# Patient Record
Sex: MALE | Race: WHITE | NOT HISPANIC OR LATINO | Employment: OTHER | URBAN - METROPOLITAN AREA
[De-identification: names, ages, dates, MRNs, and addresses within clinical notes are randomized per-mention and may not be internally consistent; named-entity substitution may affect disease eponyms.]

---

## 2021-03-12 ENCOUNTER — OUTSIDE PLACE OF SERVICE (OUTPATIENT)
Dept: CARDIOLOGY | Facility: CLINIC | Age: 81
End: 2021-03-12
Payer: MEDICARE

## 2021-03-12 PROCEDURE — 93010 ELECTROCARDIOGRAM REPORT: CPT | Mod: ,,, | Performed by: INTERNAL MEDICINE

## 2021-03-12 PROCEDURE — 93010 PR ELECTROCARDIOGRAM REPORT: ICD-10-PCS | Mod: 76,,, | Performed by: INTERNAL MEDICINE

## 2021-03-13 ENCOUNTER — OUTSIDE PLACE OF SERVICE (OUTPATIENT)
Dept: CARDIOLOGY | Facility: CLINIC | Age: 81
End: 2021-03-13
Payer: MEDICARE

## 2021-03-13 PROCEDURE — 93010 PR ELECTROCARDIOGRAM REPORT: ICD-10-PCS | Mod: ,,, | Performed by: INTERNAL MEDICINE

## 2021-03-13 PROCEDURE — 93010 ELECTROCARDIOGRAM REPORT: CPT | Mod: ,,, | Performed by: INTERNAL MEDICINE

## 2021-03-17 ENCOUNTER — HOSPITAL ENCOUNTER (OUTPATIENT)
Dept: TELEMEDICINE | Facility: HOSPITAL | Age: 81
Discharge: HOME | End: 2021-03-17
Payer: MEDICARE

## 2021-03-17 DIAGNOSIS — R41.0 DELIRIUM: ICD-10-CM

## 2021-03-17 PROCEDURE — G0425 INPT/ED TELECONSULT30: HCPCS | Mod: GT,,, | Performed by: NURSE PRACTITIONER

## 2021-03-17 PROCEDURE — G0425 PR INPT TELEHEALTH CONSULT 30M: ICD-10-PCS | Mod: GT,,, | Performed by: NURSE PRACTITIONER

## 2021-06-16 ENCOUNTER — OUTSIDE PLACE OF SERVICE (OUTPATIENT)
Dept: FAMILY MEDICINE | Facility: CLINIC | Age: 81
End: 2021-06-16
Payer: MEDICARE

## 2021-06-16 PROCEDURE — 99307 SBSQ NF CARE SF MDM 10: CPT | Mod: ,,, | Performed by: FAMILY MEDICINE

## 2021-06-16 PROCEDURE — 99307 PR NURSING FAC CARE, SUBSEQ, IMPROVING: ICD-10-PCS | Mod: ,,, | Performed by: FAMILY MEDICINE

## 2021-07-02 ENCOUNTER — LAB VISIT (OUTPATIENT)
Dept: LAB | Facility: HOSPITAL | Age: 81
End: 2021-07-02
Attending: INTERNAL MEDICINE
Payer: MEDICARE

## 2021-07-02 ENCOUNTER — OFFICE VISIT (OUTPATIENT)
Dept: HEMATOLOGY/ONCOLOGY | Facility: CLINIC | Age: 81
End: 2021-07-02
Payer: MEDICARE

## 2021-07-02 VITALS
RESPIRATION RATE: 18 BRPM | HEART RATE: 74 BPM | DIASTOLIC BLOOD PRESSURE: 73 MMHG | TEMPERATURE: 97 F | OXYGEN SATURATION: 96 % | WEIGHT: 147.94 LBS | SYSTOLIC BLOOD PRESSURE: 136 MMHG

## 2021-07-02 DIAGNOSIS — Z71.89 ADVANCE CARE PLANNING: ICD-10-CM

## 2021-07-02 DIAGNOSIS — D47.3 ESSENTIAL THROMBOCYTOSIS: ICD-10-CM

## 2021-07-02 DIAGNOSIS — D47.3 ESSENTIAL THROMBOCYTOSIS: Primary | ICD-10-CM

## 2021-07-02 LAB
ALBUMIN SERPL BCP-MCNC: 3.7 G/DL (ref 3.5–5.2)
ALP SERPL-CCNC: 217 U/L (ref 55–135)
ALT SERPL W/O P-5'-P-CCNC: 9 U/L (ref 10–44)
ANION GAP SERPL CALC-SCNC: 10 MMOL/L (ref 8–16)
ANISOCYTOSIS BLD QL SMEAR: ABNORMAL
AST SERPL-CCNC: 23 U/L (ref 10–40)
BASOPHILS # BLD AUTO: 0.24 K/UL (ref 0–0.2)
BASOPHILS NFR BLD: 1.9 % (ref 0–1.9)
BILIRUB SERPL-MCNC: 0.3 MG/DL (ref 0.1–1)
BUN SERPL-MCNC: 20 MG/DL (ref 8–23)
CALCIUM SERPL-MCNC: 8.7 MG/DL (ref 8.7–10.5)
CHLORIDE SERPL-SCNC: 109 MMOL/L (ref 95–110)
CO2 SERPL-SCNC: 23 MMOL/L (ref 23–29)
CREAT SERPL-MCNC: 0.9 MG/DL (ref 0.5–1.4)
DIFFERENTIAL METHOD: ABNORMAL
EOSINOPHIL # BLD AUTO: 0.5 K/UL (ref 0–0.5)
EOSINOPHIL NFR BLD: 3.7 % (ref 0–8)
ERYTHROCYTE [DISTWIDTH] IN BLOOD BY AUTOMATED COUNT: 15.9 % (ref 11.5–14.5)
EST. GFR  (AFRICAN AMERICAN): >60 ML/MIN/1.73 M^2
EST. GFR  (NON AFRICAN AMERICAN): >60 ML/MIN/1.73 M^2
GLUCOSE SERPL-MCNC: 92 MG/DL (ref 70–110)
HCT VFR BLD AUTO: 38.8 % (ref 40–54)
HGB BLD-MCNC: 12 G/DL (ref 14–18)
HYPOCHROMIA BLD QL SMEAR: ABNORMAL
IMM GRANULOCYTES # BLD AUTO: 0.09 K/UL (ref 0–0.04)
IMM GRANULOCYTES NFR BLD AUTO: 0.7 % (ref 0–0.5)
LDH SERPL L TO P-CCNC: 322 U/L (ref 110–260)
LYMPHOCYTES # BLD AUTO: 1.4 K/UL (ref 1–4.8)
LYMPHOCYTES NFR BLD: 10.7 % (ref 18–48)
MCH RBC QN AUTO: 24.6 PG (ref 27–31)
MCHC RBC AUTO-ENTMCNC: 30.9 G/DL (ref 32–36)
MCV RBC AUTO: 80 FL (ref 82–98)
MONOCYTES # BLD AUTO: 1 K/UL (ref 0.3–1)
MONOCYTES NFR BLD: 7.5 % (ref 4–15)
NEUTROPHILS # BLD AUTO: 9.8 K/UL (ref 1.8–7.7)
NEUTROPHILS NFR BLD: 75.5 % (ref 38–73)
NRBC BLD-RTO: 0 /100 WBC
PLATELET # BLD AUTO: 1376 K/UL (ref 150–450)
PLATELET BLD QL SMEAR: ABNORMAL
PMV BLD AUTO: 10 FL (ref 9.2–12.9)
POIKILOCYTOSIS BLD QL SMEAR: ABNORMAL
POTASSIUM SERPL-SCNC: 3.8 MMOL/L (ref 3.5–5.1)
PROT SERPL-MCNC: 7.3 G/DL (ref 6–8.4)
RBC # BLD AUTO: 4.87 M/UL (ref 4.6–6.2)
SODIUM SERPL-SCNC: 142 MMOL/L (ref 136–145)
URATE SERPL-MCNC: 5.3 MG/DL (ref 3.4–7)
WBC # BLD AUTO: 12.96 K/UL (ref 3.9–12.7)

## 2021-07-02 PROCEDURE — 99497 PR ADVNCD CARE PLAN 30 MIN: ICD-10-PCS | Mod: S$GLB,,, | Performed by: INTERNAL MEDICINE

## 2021-07-02 PROCEDURE — 1158F PR ADVANCE CARE PLANNING DISCUSS DOCUMENTED IN MEDICAL RECORD: ICD-10-PCS | Mod: S$GLB,,, | Performed by: INTERNAL MEDICINE

## 2021-07-02 PROCEDURE — 1159F PR MEDICATION LIST DOCUMENTED IN MEDICAL RECORD: ICD-10-PCS | Mod: S$GLB,,, | Performed by: INTERNAL MEDICINE

## 2021-07-02 PROCEDURE — 81270 JAK2 GENE: CPT | Performed by: INTERNAL MEDICINE

## 2021-07-02 PROCEDURE — 99999 PR PBB SHADOW E&M-EST. PATIENT-LVL III: CPT | Mod: PBBFAC,,, | Performed by: INTERNAL MEDICINE

## 2021-07-02 PROCEDURE — 99204 PR OFFICE/OUTPT VISIT, NEW, LEVL IV, 45-59 MIN: ICD-10-PCS | Mod: S$GLB,,, | Performed by: INTERNAL MEDICINE

## 2021-07-02 PROCEDURE — 3288F FALL RISK ASSESSMENT DOCD: CPT | Mod: S$GLB,,, | Performed by: INTERNAL MEDICINE

## 2021-07-02 PROCEDURE — 36415 COLL VENOUS BLD VENIPUNCTURE: CPT | Performed by: INTERNAL MEDICINE

## 2021-07-02 PROCEDURE — 1126F AMNT PAIN NOTED NONE PRSNT: CPT | Mod: S$GLB,,, | Performed by: INTERNAL MEDICINE

## 2021-07-02 PROCEDURE — 84550 ASSAY OF BLOOD/URIC ACID: CPT | Performed by: INTERNAL MEDICINE

## 2021-07-02 PROCEDURE — 99999 PR PBB SHADOW E&M-EST. PATIENT-LVL III: ICD-10-PCS | Mod: PBBFAC,,, | Performed by: INTERNAL MEDICINE

## 2021-07-02 PROCEDURE — 1126F PR PAIN SEVERITY QUANTIFIED, NO PAIN PRESENT: ICD-10-PCS | Mod: S$GLB,,, | Performed by: INTERNAL MEDICINE

## 2021-07-02 PROCEDURE — 99497 ADVNCD CARE PLAN 30 MIN: CPT | Mod: S$GLB,,, | Performed by: INTERNAL MEDICINE

## 2021-07-02 PROCEDURE — 1100F PTFALLS ASSESS-DOCD GE2>/YR: CPT | Mod: S$GLB,,, | Performed by: INTERNAL MEDICINE

## 2021-07-02 PROCEDURE — 1159F MED LIST DOCD IN RCRD: CPT | Mod: S$GLB,,, | Performed by: INTERNAL MEDICINE

## 2021-07-02 PROCEDURE — 1100F PR PT FALLS ASSESS DOC 2+ FALLS/FALL W/INJURY/YR: ICD-10-PCS | Mod: S$GLB,,, | Performed by: INTERNAL MEDICINE

## 2021-07-02 PROCEDURE — 1158F ADVNC CARE PLAN TLK DOCD: CPT | Mod: S$GLB,,, | Performed by: INTERNAL MEDICINE

## 2021-07-02 PROCEDURE — 85025 COMPLETE CBC W/AUTO DIFF WBC: CPT | Performed by: INTERNAL MEDICINE

## 2021-07-02 PROCEDURE — 99204 OFFICE O/P NEW MOD 45 MIN: CPT | Mod: S$GLB,,, | Performed by: INTERNAL MEDICINE

## 2021-07-02 PROCEDURE — 3288F PR FALLS RISK ASSESSMENT DOCUMENTED: ICD-10-PCS | Mod: S$GLB,,, | Performed by: INTERNAL MEDICINE

## 2021-07-02 PROCEDURE — 83615 LACTATE (LD) (LDH) ENZYME: CPT | Performed by: INTERNAL MEDICINE

## 2021-07-02 PROCEDURE — 80053 COMPREHEN METABOLIC PANEL: CPT | Performed by: INTERNAL MEDICINE

## 2021-07-02 RX ORDER — HYDROXYUREA 500 MG/1
500 CAPSULE ORAL DAILY
Qty: 30 CAPSULE | Refills: 11 | Status: SHIPPED | OUTPATIENT
Start: 2021-07-02 | End: 2021-07-02 | Stop reason: SDUPTHER

## 2021-07-02 RX ORDER — HYDROXYUREA 500 MG/1
500 CAPSULE ORAL DAILY
Qty: 30 CAPSULE | Refills: 11 | Status: SHIPPED | OUTPATIENT
Start: 2021-07-02 | End: 2021-08-01

## 2021-07-02 RX ORDER — HYDROXYUREA 500 MG/1
500 CAPSULE ORAL DAILY
Qty: 30 CAPSULE | Refills: 11 | Status: SHIPPED | OUTPATIENT
Start: 2021-07-02 | End: 2021-07-02

## 2021-07-02 RX ORDER — HYDROXYUREA 500 MG/1
500 CAPSULE ORAL 2 TIMES DAILY
Qty: 60 CAPSULE | Refills: 11 | Status: SHIPPED | OUTPATIENT
Start: 2021-07-02 | End: 2021-07-02

## 2021-07-07 LAB
MPNR  FINAL DIAGNOSIS: NORMAL
MPNR  SPECIMEN TYPE: NORMAL
MPNR RESULT: NORMAL

## 2021-07-28 ENCOUNTER — OUTSIDE PLACE OF SERVICE (OUTPATIENT)
Dept: FAMILY MEDICINE | Facility: CLINIC | Age: 81
End: 2021-07-28
Payer: MEDICARE

## 2021-07-28 PROCEDURE — 99308 PR NURSING FAC CARE, SUBSEQ, MINOR COMPLIC: ICD-10-PCS | Mod: ,,, | Performed by: FAMILY MEDICINE

## 2021-07-28 PROCEDURE — 99308 SBSQ NF CARE LOW MDM 20: CPT | Mod: ,,, | Performed by: FAMILY MEDICINE

## 2021-07-30 ENCOUNTER — LAB VISIT (OUTPATIENT)
Dept: LAB | Facility: HOSPITAL | Age: 81
End: 2021-07-30
Attending: INTERNAL MEDICINE
Payer: MEDICARE

## 2021-07-30 DIAGNOSIS — D47.3 ESSENTIAL THROMBOCYTOSIS: ICD-10-CM

## 2021-07-30 LAB
ALBUMIN SERPL BCP-MCNC: 4.4 G/DL (ref 3.5–5.2)
ALP SERPL-CCNC: 143 U/L (ref 38–126)
ALT SERPL W/O P-5'-P-CCNC: 19 U/L (ref 10–44)
ANION GAP SERPL CALC-SCNC: 10 MMOL/L (ref 8–16)
AST SERPL-CCNC: 39 U/L (ref 15–46)
BASOPHILS # BLD AUTO: 0.27 K/UL (ref 0–0.2)
BASOPHILS NFR BLD: 3 % (ref 0–1.9)
BILIRUB SERPL-MCNC: 0.6 MG/DL (ref 0.1–1)
CALCIUM SERPL-MCNC: 9.5 MG/DL (ref 8.7–10.5)
CHLORIDE SERPL-SCNC: 104 MMOL/L (ref 95–110)
CO2 SERPL-SCNC: 26 MMOL/L (ref 23–29)
CREAT SERPL-MCNC: 0.98 MG/DL (ref 0.5–1.4)
DIFFERENTIAL METHOD: ABNORMAL
EOSINOPHIL # BLD AUTO: 0.5 K/UL (ref 0–0.5)
EOSINOPHIL NFR BLD: 5.1 % (ref 0–8)
ERYTHROCYTE [DISTWIDTH] IN BLOOD BY AUTOMATED COUNT: 16.1 % (ref 11.5–14.5)
EST. GFR  (AFRICAN AMERICAN): >60 ML/MIN/1.73 M^2
EST. GFR  (NON AFRICAN AMERICAN): >60 ML/MIN/1.73 M^2
GLUCOSE SERPL-MCNC: 96 MG/DL (ref 70–110)
HCT VFR BLD AUTO: 38.7 % (ref 40–54)
HGB BLD-MCNC: 11.9 G/DL (ref 14–18)
IMM GRANULOCYTES # BLD AUTO: 0.06 K/UL (ref 0–0.04)
IMM GRANULOCYTES NFR BLD AUTO: 0.7 % (ref 0–0.5)
LDH SERPL L TO P-CCNC: 274 U/L (ref 110–260)
LYMPHOCYTES # BLD AUTO: 1.8 K/UL (ref 1–4.8)
LYMPHOCYTES NFR BLD: 19.8 % (ref 18–48)
MCH RBC QN AUTO: 24.6 PG (ref 27–31)
MCHC RBC AUTO-ENTMCNC: 30.7 G/DL (ref 32–36)
MCV RBC AUTO: 80 FL (ref 82–98)
MONOCYTES # BLD AUTO: 0.6 K/UL (ref 0.3–1)
MONOCYTES NFR BLD: 6.3 % (ref 4–15)
NEUTROPHILS # BLD AUTO: 5.9 K/UL (ref 1.8–7.7)
NEUTROPHILS NFR BLD: 65.1 % (ref 38–73)
NRBC BLD-RTO: 0 /100 WBC
PLATELET # BLD AUTO: 277 K/UL (ref 150–450)
PMV BLD AUTO: 10.7 FL (ref 9.2–12.9)
POTASSIUM SERPL-SCNC: 3.9 MMOL/L (ref 3.5–5.1)
PROT SERPL-MCNC: 7.3 G/DL (ref 6–8.4)
RBC # BLD AUTO: 4.83 M/UL (ref 4.6–6.2)
SODIUM SERPL-SCNC: 140 MMOL/L (ref 136–145)
URATE SERPL-MCNC: 5.4 MG/DL (ref 3.4–7)
UUN UR-MCNC: 18 MG/DL (ref 2–20)
WBC # BLD AUTO: 9.05 K/UL (ref 3.9–12.7)

## 2021-07-30 PROCEDURE — 80053 COMPREHEN METABOLIC PANEL: CPT | Mod: PO | Performed by: INTERNAL MEDICINE

## 2021-07-30 PROCEDURE — 83615 LACTATE (LD) (LDH) ENZYME: CPT | Performed by: INTERNAL MEDICINE

## 2021-07-30 PROCEDURE — 36415 COLL VENOUS BLD VENIPUNCTURE: CPT | Mod: PO | Performed by: INTERNAL MEDICINE

## 2021-07-30 PROCEDURE — 85025 COMPLETE CBC W/AUTO DIFF WBC: CPT | Mod: PO | Performed by: INTERNAL MEDICINE

## 2021-07-30 PROCEDURE — 84550 ASSAY OF BLOOD/URIC ACID: CPT | Performed by: INTERNAL MEDICINE

## 2021-09-29 ENCOUNTER — OUTSIDE PLACE OF SERVICE (OUTPATIENT)
Dept: FAMILY MEDICINE | Facility: CLINIC | Age: 81
End: 2021-09-29
Payer: MEDICARE

## 2021-09-29 PROCEDURE — 99307 SBSQ NF CARE SF MDM 10: CPT | Mod: ,,, | Performed by: FAMILY MEDICINE

## 2021-09-29 PROCEDURE — 99307 PR NURSING FAC CARE, SUBSEQ, IMPROVING: ICD-10-PCS | Mod: ,,, | Performed by: FAMILY MEDICINE

## 2021-11-03 ENCOUNTER — OUTSIDE PLACE OF SERVICE (OUTPATIENT)
Dept: FAMILY MEDICINE | Facility: CLINIC | Age: 81
End: 2021-11-03
Payer: MEDICARE

## 2021-11-03 PROCEDURE — 99308 PR NURSING FAC CARE, SUBSEQ, MINOR COMPLIC: ICD-10-PCS | Mod: ,,, | Performed by: FAMILY MEDICINE

## 2021-11-03 PROCEDURE — 99308 SBSQ NF CARE LOW MDM 20: CPT | Mod: ,,, | Performed by: FAMILY MEDICINE

## 2022-01-11 ENCOUNTER — OUTSIDE PLACE OF SERVICE (OUTPATIENT)
Dept: FAMILY MEDICINE | Facility: CLINIC | Age: 82
End: 2022-01-11
Payer: MEDICARE

## 2022-01-11 PROCEDURE — 99499 NO LOS: ICD-10-PCS | Mod: ,,, | Performed by: FAMILY MEDICINE

## 2022-01-11 PROCEDURE — 99499 UNLISTED E&M SERVICE: CPT | Mod: ,,, | Performed by: FAMILY MEDICINE

## 2022-01-21 ENCOUNTER — HOSPITAL ENCOUNTER (EMERGENCY)
Facility: HOSPITAL | Age: 82
Discharge: HOME | End: 2022-01-21
Attending: STUDENT IN AN ORGANIZED HEALTH CARE EDUCATION/TRAINING PROGRAM
Payer: MEDICARE

## 2022-01-21 VITALS
TEMPERATURE: 98 F | RESPIRATION RATE: 18 BRPM | HEIGHT: 72 IN | DIASTOLIC BLOOD PRESSURE: 67 MMHG | HEART RATE: 72 BPM | OXYGEN SATURATION: 97 % | SYSTOLIC BLOOD PRESSURE: 142 MMHG | BODY MASS INDEX: 24.38 KG/M2 | WEIGHT: 180 LBS

## 2022-01-21 DIAGNOSIS — S70.02XA CONTUSION OF LEFT HIP, INITIAL ENCOUNTER: ICD-10-CM

## 2022-01-21 DIAGNOSIS — M25.559 HIP PAIN, ACUTE: ICD-10-CM
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
## 2022-01-21 PROCEDURE — 63600175 PHARM REV CODE 636 W HCPCS: Performed by: STUDENT IN AN ORGANIZED HEALTH CARE EDUCATION/TRAINING PROGRAM

## 2022-01-21 PROCEDURE — 96372 THER/PROPH/DIAG INJ SC/IM: CPT

## 2022-01-21 PROCEDURE — 99285 EMERGENCY DEPT VISIT HI MDM: CPT | Mod: 25

## 2022-01-21 RX ORDER — MORPHINE SULFATE 4 MG/ML
5 INJECTION, SOLUTION INTRAMUSCULAR; INTRAVENOUS
Status: COMPLETED | OUTPATIENT
Start: 2022-01-21 | End: 2022-01-21

## 2022-01-21 RX ADMIN — MORPHINE SULFATE 5 MG: 4 INJECTION, SOLUTION INTRAMUSCULAR; INTRAVENOUS at 04:01

## 2022-01-21 NOTE — DISCHARGE INSTRUCTIONS
Tylenol for pain.  Please return if he gets worse or if new problems develop.  Please have him follow-up with his primary care doctor in 1 week.  We may apply ice to the left hip as needed.

## 2022-01-21 NOTE — ED PROVIDER NOTES
Encounter Date: 2022       History     Chief Complaint   Patient presents with    Fall     Pt is from [facility]. Reportedly fell at 2300 and now has Left hip pain with bruising. EMS denies any shortening. GCS 14. BIB [facility]     HPI     81-year-old male with past medical history of dementia, anemia, depression, anxiety, osteoarthritis who presents from [facility] with left hip pain. Patient reports he was attempting to get into his wheelchair when he slipped and fell.  Patient states he landed on his left hip and also struck his head and both elbows.  Denies other injuries at this time.  Denies other associated symptoms.  Denies loss of consciousness.  No history of anticoagulation use.  Patient reported to have a baseline GCS of 14.     Review of patient's allergies indicates:   Allergen Reactions    Benadryl allergy decongestant     Iodine and iodide containing products      Past Medical History:   Diagnosis Date    Anemia     Anxiety     Dementia     Depression     Dysphagia     GERD (gastroesophageal reflux disease)     Insomnia     Osteoarthritis      History reviewed. No pertinent surgical history.  History reviewed. No pertinent family history.  Social History     Tobacco Use    Smoking status: Former Smoker     Types: Cigarettes     Start date:      Quit date: 2017     Years since quittin.0    Smokeless tobacco: Never Used   Substance Use Topics    Alcohol use: Not Currently    Drug use: Not Currently     Review of Systems   Constitutional: Negative for chills and fever.   HENT: Negative for drooling and facial swelling.    Eyes: Negative for pain and visual disturbance.   Respiratory: Negative for cough and shortness of breath.    Cardiovascular: Negative for chest pain.   Gastrointestinal: Negative for abdominal pain, nausea and vomiting.   Genitourinary: Negative for dysuria.   Musculoskeletal: Positive for neck pain. Negative for back pain.   Neurological: Positive  for headaches. Negative for syncope and weakness.   Psychiatric/Behavioral: Negative for confusion.       Physical Exam     Initial Vitals [01/21/22 0403]   BP Pulse Resp Temp SpO2   (!) 149/70 67 18 97.4 °F (36.3 °C) 98 %      MAP       --         Physical Exam    Nursing note and vitals reviewed.  Constitutional: He appears well-nourished. He is not diaphoretic.   HENT:   Head: Normocephalic and atraumatic.   Right Ear: External ear normal.   Left Ear: External ear normal.   Eyes: Conjunctivae and EOM are normal. Pupils are equal, round, and reactive to light. No scleral icterus.   Neck: Neck supple. No tracheal deviation present.   Normal range of motion.  Cardiovascular: Normal rate, regular rhythm, normal heart sounds and intact distal pulses.   Pulmonary/Chest: Breath sounds normal. No respiratory distress.   Abdominal: Abdomen is soft.   Musculoskeletal:         General: Tenderness present.      Cervical back: Normal range of motion and neck supple. No edema, erythema or rigidity. Spinous process tenderness and muscular tenderness present. Normal range of motion.      Right hip: Normal.      Left hip: Tenderness and bony tenderness present. No deformity, lacerations or crepitus. Decreased range of motion. Normal strength.      Right upper leg: Normal.      Left upper leg: Tenderness and bony tenderness present. No swelling, edema or deformity.     Neurological: He is alert and oriented to person, place, and time. He has normal strength. No cranial nerve deficit or sensory deficit.   Skin: Skin is warm and dry. Capillary refill takes less than 2 seconds. Bruising (over the lateral aspect of the proximal left femur ) noted. No erythema.   Psychiatric: He has a normal mood and affect.         ED Course   Procedures  Labs Reviewed - No data to display       Imaging Results          X-Ray Hip 2 or 3 views Left (with Pelvis when performed) (Final result)  Result time 01/21/22 06:03:11    Final result by Ebonie PINEDA  MD Ab (01/21/22 06:03:11)                 Impression:      No radiographic evidence of acute osseous injury or dislocation.  Further evaluation as warranted clinically.      Electronically signed by: Ebonie Berger MD  Date:    01/21/2022  Time:    06:03             Narrative:    EXAMINATION:  XR HIP WITH PELVIS WHEN PERFORMED, 2 OR 3 VIEWS LEFT; XR FEMUR 2 VIEW LEFT    CLINICAL HISTORY:  Pain in unspecified hip    TECHNIQUE:  AP view of the pelvis and frog leg lateral view of the left hip were performed.    AP and lateral views of the left femur were performed.    COMPARISON:  None    FINDINGS:  The bilateral femoral heads appear well seated within the acetabula with bilateral degenerative arthrosis.  The left femur appears intact.  Prominent vascular calcifications are present.  The ilioischial and iliopectineal lines appear maintained.There is no significant pubic symphyseal or sacroiliac joint diastasis.The sacrum is partially obscured by overlying bowel gas. Multiple surgical clips and brachytherapy seeds project over the pelvis.                               X-Ray Femur Ap/Lat Left (Final result)  Result time 01/21/22 06:03:11    Final result by Ebonie Berger MD (01/21/22 06:03:11)                 Impression:      No radiographic evidence of acute osseous injury or dislocation.  Further evaluation as warranted clinically.      Electronically signed by: Ebonie Berger MD  Date:    01/21/2022  Time:    06:03             Narrative:    EXAMINATION:  XR HIP WITH PELVIS WHEN PERFORMED, 2 OR 3 VIEWS LEFT; XR FEMUR 2 VIEW LEFT    CLINICAL HISTORY:  Pain in unspecified hip    TECHNIQUE:  AP view of the pelvis and frog leg lateral view of the left hip were performed.    AP and lateral views of the left femur were performed.    COMPARISON:  None    FINDINGS:  The bilateral femoral heads appear well seated within the acetabula with bilateral degenerative arthrosis.  The left femur appears intact.  Prominent vascular  calcifications are present.  The ilioischial and iliopectineal lines appear maintained.There is no significant pubic symphyseal or sacroiliac joint diastasis.The sacrum is partially obscured by overlying bowel gas. Multiple surgical clips and brachytherapy seeds project over the pelvis.                               CT Head Without Contrast (Final result)  Result time 01/21/22 05:47:11    Final result by Ebonie Berger MD (01/21/22 05:47:11)                 Impression:      1. No CT evidence of acute intracranial abnormality. Clinical correlation and further evaluation as warranted.  2. Generalized cerebral volume loss and findings suggestive of chronic microvascular ischemic change.  3. No CT evidence of acute cervical spine fracture.  Clinical correlation and further evaluation as warranted.  4. Significant multilevel degenerative change of the cervical spine.      Electronically signed by: Ebonie Berger MD  Date:    01/21/2022  Time:    05:47             Narrative:    EXAMINATION:  CT HEAD WITHOUT CONTRAST; CT CERVICAL SPINE WITHOUT CONTRAST    CLINICAL HISTORY:  fall;    TECHNIQUE:  Low dose axial images were obtained through the head and cervical spine.  Coronal and sagittal reformations were also performed. Contrast was not administered.    COMPARISON:  Head CT 5/9/2011    FINDINGS:  Head CT:    There is no acute intracranial hemorrhage, hydrocephalus, midline shift or mass effect. There is generalized cerebral volume loss with compensatory prominence of cerebral sulci and the ventricular system.  There is hypoattenuation in the supratentorial white matter which is nonspecific but likely reflect sequela of chronic microvascular ischemic change.  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  Gray-white matter differentiation is otherwise maintained.  The mastoid air cells and paranasal sinuses are clear of acute process. The visualized bones of the calvarium demonstrate no acute osseous  abnormality.    Cervical spine:    There is anterolisthesis of C2 on C3 and C3 on C4 and retrolisthesis of C5 on C6.  Findings are presumably degenerative in etiology noting significant bilateral facet arthropathy at these levels.  The facet joints articulate appropriately.  There is no significant prevertebral soft tissue swelling.  There is significant intervertebral disc height loss at the C4-C5, C5-C6 and C6-C7 levels with associated anterior osteophytosis and discogenic change.  There is degenerative change/pannus formation about the dens.  There is multilevel degenerative change of the cervical spine consisting of posterior disc osteophyte complexes, uncovertebral joint DJD and facet arthropathy resulting in multilevel significant neural foraminal narrowing and variable degrees of spinal canal stenosis.  The visualized soft tissue structures demonstrate calcific atherosclerosis of the carotid artery bifurcations.  The visualized lung apices are clear.                               CT Cervical Spine Without Contrast (Final result)  Result time 01/21/22 05:47:11    Final result by Ebonie Berger MD (01/21/22 05:47:11)                 Impression:      1. No CT evidence of acute intracranial abnormality. Clinical correlation and further evaluation as warranted.  2. Generalized cerebral volume loss and findings suggestive of chronic microvascular ischemic change.  3. No CT evidence of acute cervical spine fracture.  Clinical correlation and further evaluation as warranted.  4. Significant multilevel degenerative change of the cervical spine.      Electronically signed by: Ebonie Berger MD  Date:    01/21/2022  Time:    05:47             Narrative:    EXAMINATION:  CT HEAD WITHOUT CONTRAST; CT CERVICAL SPINE WITHOUT CONTRAST    CLINICAL HISTORY:  fall;    TECHNIQUE:  Low dose axial images were obtained through the head and cervical spine.  Coronal and sagittal reformations were also performed. Contrast was not  administered.    COMPARISON:  Head CT 5/9/2011    FINDINGS:  Head CT:    There is no acute intracranial hemorrhage, hydrocephalus, midline shift or mass effect. There is generalized cerebral volume loss with compensatory prominence of cerebral sulci and the ventricular system.  There is hypoattenuation in the supratentorial white matter which is nonspecific but likely reflect sequela of chronic microvascular ischemic change.  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  Gray-white matter differentiation is otherwise maintained.  The mastoid air cells and paranasal sinuses are clear of acute process. The visualized bones of the calvarium demonstrate no acute osseous abnormality.    Cervical spine:    There is anterolisthesis of C2 on C3 and C3 on C4 and retrolisthesis of C5 on C6.  Findings are presumably degenerative in etiology noting significant bilateral facet arthropathy at these levels.  The facet joints articulate appropriately.  There is no significant prevertebral soft tissue swelling.  There is significant intervertebral disc height loss at the C4-C5, C5-C6 and C6-C7 levels with associated anterior osteophytosis and discogenic change.  There is degenerative change/pannus formation about the dens.  There is multilevel degenerative change of the cervical spine consisting of posterior disc osteophyte complexes, uncovertebral joint DJD and facet arthropathy resulting in multilevel significant neural foraminal narrowing and variable degrees of spinal canal stenosis.  The visualized soft tissue structures demonstrate calcific atherosclerosis of the carotid artery bifurcations.  The visualized lung apices are clear.                                 Medications   morphine injection 5 mg (5 mg Intramuscular Given 1/21/22 0433)     Medical Decision Making:   History:   I obtained history from: EMS provider.  Old Medical Records: I decided to obtain old medical records.  Clinical Tests:    Radiological Study: Ordered  ED Management:   [facility]  This is an emergent evaluation of a 81-year-old male with past medical history of dementia, anemia, depression, anxiety, osteoarthritis who presents from [facility] with left hip pain after a fall last night. Initial vitals in the ED with a blood pressure of 149/70 and remainder of vitals unremarkable.  Physical exam notable for a non-toxic appearing elderly male with tenderness with palpation over the lateral aspect of the anterior left hip and proximal lateral aspect of the left femur with associated bruising.  No obvious deformity, leg shortening or rotation.  Strength and sensation intact.  There is also tenderness with palpation to the midline upper lower cervical spine a processes and musculature.  No obvious step-offs or deformities.  Remainder of exam benign. DDx includes but is not limited to femur fracture, hip dislocation, pelvic fracture, contusion, occult intracranial injury. Will obtain imaging including x-rays of the left pelvis and femur, CT scan of the head and cervical spine.  No labs clinically indicated at this time.  Patient pain control with IM pain medication. Will continue to monitor and frequently reassess pending results of imaging, treatments and final disposition. Patient is aware of plan and is amenable.     Whit Gee D.O  EMERGENCY MEDICINE  5:23 AM 01/21/2022    UPDATE  Plain films of the left hip and femur showed no obvious fracture or dislocation.  CT scans of the head and neck showed no obvious fracture or dislocation.  Patient did show some degenerative changes in the neck.  On reassessment, patient states that his symptoms were minimally improved.  On re-evaluation of his left femur he now has developed a small hematoma and remains tender with palpation that appears unchanged from initial evaluation. Will sign patient out to Dr. Harp pending final disposition for the patient is aware and agreeable to  plan.  Whit Gee D.O   EMERGENCY MEDICINE   6:26 AM 01/21/2022    This is Doctor Loyd dictating.  I examined this patient at 6:56 a.m. the patient does have a contusion and hematoma of the left hip.  There is no significant pain with range of motion of the hip.  Long bones are stable.  There is no shortening or external rotation.  I was able to walk the patient.  He has a very slight left limp.  X-rays are negative for fracture.  I am comfortable that the left hip is not fractured.  I will discharge to outpatient evaluation and treatment.                      Clinical Impression:   Final diagnoses:  [M25.559] Hip pain, acute  [W19.XXXA] Fall, initial encounter (Primary)  [S70.02XA] Contusion of left hip, initial encounter          ED Disposition Condition    Discharge Stable        ED Prescriptions     None        Follow-up Information     Follow up With Specialties Details Why Contact Info    Marilin Perales MD Internal Medicine In 1 week  [address] [place] [address]  [address]  [facility] [id number]  [phone]             Moshe Harp MD  01/21/22 0702

## 2022-01-21 NOTE — ED TRIAGE NOTES
Pt to ER with c/o fall that happened at [facility] where pt resides. Pt fell trying to get in wheelchair and has left hip pain.Small abrasion noted to left outer thigh.

## 2022-02-02 ENCOUNTER — OUTSIDE PLACE OF SERVICE (OUTPATIENT)
Dept: FAMILY MEDICINE | Facility: CLINIC | Age: 82
End: 2022-02-02
Payer: MEDICARE

## 2022-02-02 PROCEDURE — 99307 PR NURSING FAC CARE, SUBSEQ, IMPROVING: ICD-10-PCS | Mod: ,,, | Performed by: FAMILY MEDICINE

## 2022-02-02 PROCEDURE — 99307 SBSQ NF CARE SF MDM 10: CPT | Mod: ,,, | Performed by: FAMILY MEDICINE

## 2022-02-09 ENCOUNTER — OUTSIDE PLACE OF SERVICE (OUTPATIENT)
Dept: FAMILY MEDICINE | Facility: CLINIC | Age: 82
End: 2022-02-09
Payer: MEDICARE

## 2022-02-09 PROCEDURE — 99308 SBSQ NF CARE LOW MDM 20: CPT | Mod: ,,, | Performed by: FAMILY MEDICINE

## 2022-02-09 PROCEDURE — 99308 PR NURSING FAC CARE, SUBSEQ, MINOR COMPLIC: ICD-10-PCS | Mod: ,,, | Performed by: FAMILY MEDICINE

## 2022-03-23 ENCOUNTER — OUTSIDE PLACE OF SERVICE (OUTPATIENT)
Dept: FAMILY MEDICINE | Facility: CLINIC | Age: 82
End: 2022-03-23
Payer: MEDICARE

## 2022-03-23 PROCEDURE — 99308 SBSQ NF CARE LOW MDM 20: CPT | Mod: ,,, | Performed by: FAMILY MEDICINE

## 2022-03-23 PROCEDURE — 99308 PR NURSING FAC CARE, SUBSEQ, MINOR COMPLIC: ICD-10-PCS | Mod: ,,, | Performed by: FAMILY MEDICINE

## 2022-03-25 ENCOUNTER — HOSPITAL ENCOUNTER (EMERGENCY)
Facility: HOSPITAL | Age: 82
Discharge: PSYCHIATRIC OR SUBSTANCE USE FACILITY | End: 2022-03-25
Attending: EMERGENCY MEDICINE
Payer: MEDICARE

## 2022-03-25 VITALS
TEMPERATURE: 98 F | DIASTOLIC BLOOD PRESSURE: 77 MMHG | RESPIRATION RATE: 16 BRPM | HEART RATE: 61 BPM | SYSTOLIC BLOOD PRESSURE: 152 MMHG | OXYGEN SATURATION: 95 %

## 2022-03-25 DIAGNOSIS — H10.9 BACTERIAL CONJUNCTIVITIS: ICD-10-CM

## 2022-03-25 DIAGNOSIS — S41.112A SKIN TEAR OF LEFT UPPER EXTREMITY: ICD-10-CM

## 2022-03-25 DIAGNOSIS — T14.8XXA HEMATOMA: ICD-10-CM

## 2022-03-25 DIAGNOSIS — D75.839 THROMBOCYTOSIS: ICD-10-CM

## 2022-03-25 DIAGNOSIS — S70.02XA HEMATOMA OF LEFT HIP, INITIAL ENCOUNTER: ICD-10-CM

## 2022-03-25 DIAGNOSIS — L03.113 CELLULITIS OF HAND, RIGHT: Primary | ICD-10-CM
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
## 2022-03-25 LAB
ANION GAP SERPL CALC-SCNC: 10 MMOL/L (ref 8–16)
ANISOCYTOSIS BLD QL SMEAR: SLIGHT
BASOPHILS # BLD AUTO: 0.22 K/UL (ref 0–0.2)
BASOPHILS NFR BLD: 1.6 % (ref 0–1.9)
BNP SERPL-MCNC: 246 PG/ML (ref 0–99)
BUN SERPL-MCNC: 22 MG/DL (ref 8–23)
BURR CELLS BLD QL SMEAR: ABNORMAL
CALCIUM SERPL-MCNC: 9.2 MG/DL (ref 8.7–10.5)
CHLORIDE SERPL-SCNC: 109 MMOL/L (ref 95–110)
CO2 SERPL-SCNC: 25 MMOL/L (ref 23–29)
CREAT SERPL-MCNC: 0.7 MG/DL (ref 0.5–1.4)
CRP SERPL-MCNC: 22.7 MG/L (ref 0–8.2)
DIFFERENTIAL METHOD: ABNORMAL
EOSINOPHIL # BLD AUTO: 0.3 K/UL (ref 0–0.5)
EOSINOPHIL NFR BLD: 1.8 % (ref 0–8)
ERYTHROCYTE [DISTWIDTH] IN BLOOD BY AUTOMATED COUNT: 17.1 % (ref 11.5–14.5)
ERYTHROCYTE [SEDIMENTATION RATE] IN BLOOD BY WESTERGREN METHOD: 47 MM/HR (ref 0–23)
EST. GFR  (AFRICAN AMERICAN): >60 ML/MIN/1.73 M^2
EST. GFR  (NON AFRICAN AMERICAN): >60 ML/MIN/1.73 M^2
GLUCOSE SERPL-MCNC: 98 MG/DL (ref 70–110)
HCT VFR BLD AUTO: 36.7 % (ref 40–54)
HGB BLD-MCNC: 11.5 G/DL (ref 14–18)
HYPOCHROMIA BLD QL SMEAR: ABNORMAL
IMM GRANULOCYTES # BLD AUTO: 0.36 K/UL (ref 0–0.04)
IMM GRANULOCYTES NFR BLD AUTO: 2.7 % (ref 0–0.5)
LYMPHOCYTES # BLD AUTO: 1.1 K/UL (ref 1–4.8)
LYMPHOCYTES NFR BLD: 7.9 % (ref 18–48)
MCH RBC QN AUTO: 28.5 PG (ref 27–31)
MCHC RBC AUTO-ENTMCNC: 31.3 G/DL (ref 32–36)
MCV RBC AUTO: 91 FL (ref 82–98)
MEGAKARYOCYTIC FRAGMENTS: PRESENT
MONOCYTES # BLD AUTO: 1.7 K/UL (ref 0.3–1)
MONOCYTES NFR BLD: 12.4 % (ref 4–15)
NEUTROPHILS # BLD AUTO: 10 K/UL (ref 1.8–7.7)
NEUTROPHILS NFR BLD: 73.6 % (ref 38–73)
NRBC BLD-RTO: 0 /100 WBC
OVALOCYTES BLD QL SMEAR: ABNORMAL
PLATELET # BLD AUTO: 1045 K/UL (ref 150–450)
PLATELET BLD QL SMEAR: ABNORMAL
PMV BLD AUTO: 9.9 FL (ref 9.2–12.9)
POIKILOCYTOSIS BLD QL SMEAR: SLIGHT
POLYCHROMASIA BLD QL SMEAR: ABNORMAL
POTASSIUM SERPL-SCNC: 3.9 MMOL/L (ref 3.5–5.1)
RBC # BLD AUTO: 4.03 M/UL (ref 4.6–6.2)
SCHISTOCYTES BLD QL SMEAR: ABNORMAL
SODIUM SERPL-SCNC: 144 MMOL/L (ref 136–145)
WBC # BLD AUTO: 13.52 K/UL (ref 3.9–12.7)

## 2022-03-25 PROCEDURE — 93010 EKG 12-LEAD: ICD-10-PCS | Mod: ,,, | Performed by: INTERNAL MEDICINE

## 2022-03-25 PROCEDURE — 99284 PR EMERGENCY DEPT VISIT,LEVEL IV: ICD-10-PCS | Mod: ,,, | Performed by: PHYSICIAN ASSISTANT

## 2022-03-25 PROCEDURE — 85652 RBC SED RATE AUTOMATED: CPT | Performed by: PHYSICIAN ASSISTANT

## 2022-03-25 PROCEDURE — 86140 C-REACTIVE PROTEIN: CPT | Performed by: PHYSICIAN ASSISTANT

## 2022-03-25 PROCEDURE — 25000003 PHARM REV CODE 250: Performed by: PHYSICIAN ASSISTANT

## 2022-03-25 PROCEDURE — 93010 ELECTROCARDIOGRAM REPORT: CPT | Mod: ,,, | Performed by: INTERNAL MEDICINE

## 2022-03-25 PROCEDURE — 93005 ELECTROCARDIOGRAM TRACING: CPT

## 2022-03-25 PROCEDURE — 99285 EMERGENCY DEPT VISIT HI MDM: CPT | Mod: 25

## 2022-03-25 PROCEDURE — 85025 COMPLETE CBC W/AUTO DIFF WBC: CPT | Performed by: PHYSICIAN ASSISTANT

## 2022-03-25 PROCEDURE — 80048 BASIC METABOLIC PNL TOTAL CA: CPT | Performed by: PHYSICIAN ASSISTANT

## 2022-03-25 PROCEDURE — 83880 ASSAY OF NATRIURETIC PEPTIDE: CPT | Performed by: PHYSICIAN ASSISTANT

## 2022-03-25 PROCEDURE — 99284 EMERGENCY DEPT VISIT MOD MDM: CPT | Mod: ,,, | Performed by: PHYSICIAN ASSISTANT

## 2022-03-25 RX ORDER — FLUOXETINE HYDROCHLORIDE 20 MG/1
60 CAPSULE ORAL EVERY MORNING
COMMUNITY
Start: 2022-01-03

## 2022-03-25 RX ORDER — HYDROXYUREA 500 MG/1
500 CAPSULE ORAL DAILY
COMMUNITY
Start: 2022-02-17

## 2022-03-25 RX ORDER — FOLIC ACID 1 MG/1
1000 TABLET ORAL DAILY
COMMUNITY
Start: 2022-03-07

## 2022-03-25 RX ORDER — ERYTHROMYCIN 5 MG/G
OINTMENT OPHTHALMIC
Status: COMPLETED | OUTPATIENT
Start: 2022-03-25 | End: 2022-03-25

## 2022-03-25 RX ORDER — QUETIAPINE FUMARATE 50 MG/1
TABLET, FILM COATED ORAL
COMMUNITY
Start: 2022-03-22

## 2022-03-25 RX ORDER — ACETAMINOPHEN 500 MG
1000 TABLET ORAL
Status: COMPLETED | OUTPATIENT
Start: 2022-03-25 | End: 2022-03-25

## 2022-03-25 RX ORDER — QUETIAPINE FUMARATE 300 MG/1
TABLET, FILM COATED ORAL
COMMUNITY
Start: 2022-01-11

## 2022-03-25 RX ORDER — SERTRALINE HYDROCHLORIDE 100 MG/1
100 TABLET, FILM COATED ORAL DAILY
COMMUNITY
Start: 2022-03-22

## 2022-03-25 RX ORDER — QUETIAPINE FUMARATE 200 MG/1
200 TABLET, FILM COATED ORAL NIGHTLY
COMMUNITY
Start: 2022-02-09

## 2022-03-25 RX ORDER — CALCIUM CARBONATE/VITAMIN D3 600 MG-10
100 TABLET ORAL DAILY
COMMUNITY
Start: 2022-02-14

## 2022-03-25 RX ORDER — ASPIRIN 81 MG/1
81 TABLET ORAL DAILY
COMMUNITY
Start: 2022-02-25

## 2022-03-25 RX ORDER — PANTOPRAZOLE SODIUM 40 MG/1
40 TABLET, DELAYED RELEASE ORAL DAILY
COMMUNITY
Start: 2022-02-22

## 2022-03-25 RX ORDER — HYDROXYZINE HYDROCHLORIDE 25 MG/1
25 TABLET, FILM COATED ORAL DAILY
COMMUNITY
Start: 2022-03-22

## 2022-03-25 RX ORDER — PNV NO.95/FERROUS FUM/FOLIC AC 28MG-0.8MG
1000 TABLET ORAL EVERY MORNING
COMMUNITY
Start: 2022-01-12

## 2022-03-25 RX ORDER — ESCITALOPRAM OXALATE 10 MG/1
TABLET ORAL
COMMUNITY
Start: 2022-02-25

## 2022-03-25 RX ORDER — CLOPIDOGREL BISULFATE 75 MG/1
75 TABLET ORAL DAILY
COMMUNITY
Start: 2022-02-22

## 2022-03-25 RX ORDER — ERYTHROMYCIN 5 MG/G
OINTMENT OPHTHALMIC
Qty: 3.5 G | Refills: 0 | Status: SHIPPED | OUTPATIENT
Start: 2022-03-25

## 2022-03-25 RX ORDER — LANOLIN ALCOHOL/MO/W.PET/CERES
1 CREAM (GRAM) TOPICAL DAILY
COMMUNITY
Start: 2022-03-11

## 2022-03-25 RX ORDER — DOXYCYCLINE 100 MG/1
100 CAPSULE ORAL EVERY 12 HOURS
Qty: 20 CAPSULE | Refills: 0 | Status: SHIPPED | OUTPATIENT
Start: 2022-03-25 | End: 2022-04-04

## 2022-03-25 RX ORDER — ATORVASTATIN CALCIUM 40 MG/1
40 TABLET, FILM COATED ORAL NIGHTLY
COMMUNITY
Start: 2022-03-11

## 2022-03-25 RX ORDER — AMLODIPINE BESYLATE 5 MG/1
5 TABLET ORAL 2 TIMES DAILY
COMMUNITY
Start: 2022-02-01

## 2022-03-25 RX ORDER — DIVALPROEX SODIUM 500 MG/1
500 TABLET, DELAYED RELEASE ORAL 2 TIMES DAILY
COMMUNITY
Start: 2022-03-22

## 2022-03-25 RX ADMIN — ACETAMINOPHEN 1000 MG: 500 TABLET ORAL at 04:03

## 2022-03-25 RX ADMIN — ERYTHROMYCIN 1 INCH: 5 OINTMENT OPHTHALMIC at 04:03

## 2022-03-25 NOTE — ED PROVIDER NOTES
Encounter Date: 3/25/2022       History     Chief Complaint   Patient presents with    Fall     Sent from [facility],had fall yesterday and was seen by physician today and concerned for left hip fracture. No shortening or rotation noted, hematoma to left hip     2:12 PM  Patient is a this 81-year-old male with a history of dementia, osteoarthritis, GERD, anxiety who presents the [facility] ED via EMS from [facility] for fall.     Patient alert and oriented x4.  Patient states that he fell when he was getting out of his bed to the wheelchair at his previous nursing home.  This happened yesterday.  Patient was sent back to [facility] behavior due to agitation.  EMS reports that he was throwing food and items at staff so they sent him back to [facility] after being discharged back to the nursing home on Monday from there.  Patient was sent to the ED for evaluation of his fall yesterday.  Patient endorses pain to his neck, right hand, and left hip.  He denies any head trauma.  He is on Plavix.  Denies any chest, abdomen, and back pain. He has no other complaints.        Review of patient's allergies indicates:   Allergen Reactions    Benadryl allergy decongestant     Iodine and iodide containing products      Past Medical History:   Diagnosis Date    Anemia     Anxiety     Dementia     Depression     Dysphagia     GERD (gastroesophageal reflux disease)     Insomnia     Osteoarthritis      History reviewed. No pertinent surgical history.  History reviewed. No pertinent family history.  Social History     Tobacco Use    Smoking status: Former Smoker     Types: Cigarettes     Start date:      Quit date: 2017     Years since quittin.2    Smokeless tobacco: Never Used   Substance Use Topics    Alcohol use: Not Currently    Drug use: Not Currently     Review of Systems   Constitutional: Negative for chills, diaphoresis and fever.   HENT: Negative for sore throat.    Respiratory: Negative for cough and shortness of breath.     Cardiovascular: Negative for chest pain.   Gastrointestinal: Negative for abdominal pain, diarrhea, nausea and vomiting.   Genitourinary: Negative for dysuria, frequency and hematuria.   Musculoskeletal: Positive for arthralgias, myalgias and neck pain. Negative for back pain.   Skin: Negative for rash.   Neurological: Negative for weakness.   Hematological: Does not bruise/bleed easily.       Physical Exam     Initial Vitals [03/25/22 1332]   BP Pulse Resp Temp SpO2   (!) 160/90 82 16 98.4 °F (36.9 °C) 96 %      MAP       --         Physical Exam    Vitals reviewed.  Constitutional: He appears well-developed and well-nourished. He is not diaphoretic. He is cooperative.  Non-toxic appearance. He does not have a sickly appearance. He does not appear ill. No distress. Face mask in place.   HENT:   Head: Normocephalic and atraumatic. Head is without raccoon's eyes and without abrasion.   Nose: Nose normal. No epistaxis.   Mouth/Throat: No trismus in the jaw.   Eyes: Conjunctivae and EOM are normal. Right eye exhibits discharge. Left eye exhibits discharge. Right conjunctiva is not injected. Left conjunctiva is not injected. No scleral icterus.   Neck:       Normal range of motion.  Pulmonary/Chest: No accessory muscle usage. No tachypnea. No respiratory distress.   Abdominal: He exhibits no distension.   Musculoskeletal:         General: Normal range of motion.      Right shoulder: Normal.      Left shoulder: Normal.      Right elbow: Normal.      Left elbow: Normal. No lacerations.      Right forearm: Normal.      Left forearm: Normal.      Right wrist: Normal. No snuff box tenderness.      Left wrist: Normal. No snuff box tenderness.      Right hand: Swelling and tenderness present. Normal range of motion.      Left hand: Normal.      Cervical back: Normal range of motion. No rigidity. Muscular tenderness present. No spinous process tenderness. Normal range of motion.      Thoracic back: No bony tenderness. Normal  range of motion.      Lumbar back: No bony tenderness. Normal range of motion.      Right hip: No tenderness or bony tenderness. Normal range of motion. Normal strength.      Left hip: Tenderness (to large hematoma to lateral L hip) present. No bony tenderness. Normal range of motion. Normal strength.      Right knee: Normal.      Left knee: Normal.      Right ankle: Normal.      Left ankle: Normal.      Right foot: Normal.      Left foot: Normal.      Comments: Somewhat hunched over when up. Able to ambulate at baseline. Shuffling gait. Baseline per daughter.  Skin tear to L posterior arm.  Erythema to R dorsal hand. +TTP. No underlying dec ROM.  Large hematoma to L lateral hip with TTP. See images below.     Neurological: He is alert. He has normal strength.   Skin: Skin is dry. No pallor.                         ED Course   Procedures  Labs Reviewed   CBC W/ AUTO DIFFERENTIAL - Abnormal; Notable for the following components:       Result Value    WBC 13.52 (*)     RBC 4.03 (*)     Hemoglobin 11.5 (*)     Hematocrit 36.7 (*)     MCHC 31.3 (*)     RDW 17.1 (*)     Platelets 1,045 (*)     Immature Granulocytes 2.7 (*)     Gran # (ANC) 10.0 (*)     Immature Grans (Abs) 0.36 (*)     Mono # 1.7 (*)     Baso # 0.22 (*)     Gran % 73.6 (*)     Lymph % 7.9 (*)     Platelet Estimate Increased (*)     All other components within normal limits    Narrative:       PLT critical result(s) called and verbal readback obtained from   Vickie Negrete Rn by JEANETTE 03/25/2022 16:07   SEDIMENTATION RATE - Abnormal; Notable for the following components:    Sed Rate 47 (*)     All other components within normal limits   C-REACTIVE PROTEIN - Abnormal; Notable for the following components:    CRP 22.7 (*)     All other components within normal limits   B-TYPE NATRIURETIC PEPTIDE - Abnormal; Notable for the following components:     (*)     All other components within normal limits    Narrative:     add-on BNP per BIBIANA Hook PA-C  03/25/2022   16:32 [id number]   BASIC METABOLIC PANEL   B-TYPE NATRIURETIC PEPTIDE          Imaging Results          X-Ray Chest AP Portable (Final result)  Result time 03/25/22 16:10:08    Final result by Judah Rees III, MD (03/25/22 16:10:08)                 Impression:      Possible CHF.      Electronically signed by: Judah Rees MD  Date:    03/25/2022  Time:    16:10             Narrative:    EXAMINATION:  XR CHEST AP PORTABLE    CLINICAL HISTORY:  Unspecified fall, initial encounter    FINDINGS:  Chest one view portable.    There is a pacer.  There is aortic valve repair.  There is cardiomegaly aortic plaque and DJD.  There are bilateral pulmonary infiltrates.                               X-Ray Hip 2 or 3 views Left (with Pelvis when performed) (Final result)  Result time 03/25/22 16:09:39    Final result by Judah Rees III, MD (03/25/22 16:09:39)                 Narrative:    EXAMINATION:  XR HIP WITH PELVIS WHEN PERFORMED, 2 OR 3 VIEWS LEFT    CLINICAL HISTORY:  Unspecified fall, initial encounter    FINDINGS:  Two views left hip:: There are prostate radiation seeds.  There is postoperative change.  No fracture dislocation or bone destruction seen.      Electronically signed by: Jduah Rees MD  Date:    03/25/2022  Time:    16:09                             X-Ray Hand 3 view Right (Final result)  Result time 03/25/22 16:08:54    Final result by Judah Rees III, MD (03/25/22 16:08:54)                 Impression:      No acute process seen.      Electronically signed by: Judah Rees MD  Date:    03/25/2022  Time:    16:08             Narrative:    EXAMINATION:  XR HAND COMPLETE 3 VIEW RIGHT    CLINICAL HISTORY:  right hand pain, swelling, redness;    FINDINGS:  Complete three views right: No fracture, dislocation, or bone destruction seen.  There is a small radiopaque foreign body in the soft tissues anterior to the distal phalanx of the 4th digit.  There is baseline DJD.                                CT Head Without Contrast (Final result)  Result time 03/25/22 15:38:29    Final result by Juan Carlos Garibay MD (03/25/22 15:38:29)                 Impression:      1. No acute intracranial process.  2. Involutional changes with chronic microvascular ischemic changes and small remote lacunar infarcts of the right corona radiata, right lateral basal ganglia, right coleman and left caudate.      Electronically signed by: Juan Carlos Garibay  Date:    03/25/2022  Time:    15:38             Narrative:    EXAMINATION:  CT HEAD WITHOUT CONTRAST    CLINICAL HISTORY:  Facial trauma, blunt;    TECHNIQUE:  Low dose axial CT images obtained throughout the head without intravenous contrast. Sagittal and coronal reconstructions were performed.    COMPARISON:  01/21/2022    FINDINGS:  Intracranial compartment:    Ventricles and sulci are normal in size for age without evidence of hydrocephalus. No extra-axial blood or fluid collections.    Mild involutional changes.  Moderate chronic microvascular ischemic changes in periventricular white matter.  Small remote lacunar infarcts in the right corona radiata, right lateral basal ganglia, left caudate and right coleman.  No parenchymal mass, hemorrhage, edema or major vascular distribution infarct.    Skull/extracranial contents (limited evaluation): No fracture. Mastoid air cells and paranasal sinuses are essentially clear.    No significant change.                               CT Cervical Spine Without Contrast (Final result)  Result time 03/25/22 15:46:42    Final result by Juan Carlos Garibay MD (03/25/22 15:46:42)                 Impression:      1. No acute abnormality  2. Multilevel chronic degenerative changes.      Electronically signed by: Juan Carlos Garibay  Date:    03/25/2022  Time:    15:46             Narrative:    EXAMINATION:  CT CERVICAL SPINE WITHOUT CONTRAST    CLINICAL HISTORY:  Neck trauma (Age >= 65y);    TECHNIQUE:  Low dose axial CT images through the cervical spine, with  sagittal and coronal reformations.  Contrast was not administered.    COMPARISON:  01/21/2022    FINDINGS:  No acute fractures are detected.  Grade 1 spondylolisthesis of C2 on C3 and C3 on C4.    Severe disc space narrowing at C4-5 C5-6 and C6-7 with associated endplate degenerative changes.  Moderate disc space narrowing at C three-four with vacuum disc phenomena.  Mild disc space narrowing elsewhere.    Bilateral facet arthropathy throughout the cervical spine most prominent at C2-3, C3-4 and C4-5 bilaterally.    Prevertebral soft tissues appear within normal limits.    Severe foraminal narrowing bilaterally at C3-4, C4-5 on the left, C5-6 bilaterally.    Mild diffuse posterior disc osteophyte complex from C3 through C7.  Minimal in covering of the disc posteriorly at C3-4 with minimal protrusion.    Central canal is adequately maintained.    Limited evaluation of the intraspinal contents demonstrates no hematoma or mass.Paraspinal soft tissues exhibit no acute abnormalities.                                 Medications   acetaminophen tablet 1,000 mg (1,000 mg Oral Given 3/25/22 1643)   erythromycin 5 mg/gram (0.5 %) ophthalmic ointment (1 inch Both Eyes Given 3/25/22 1656)     Medical Decision Making:   History:   Old Medical Records: I decided to obtain old medical records.  Old Records Summarized: records from clinic visits and records from previous admission(s).  Initial Assessment:   Patient is a this 81-year-old male with a history of dementia, osteoarthritis, GERD, anxiety who presents the [facility] ED via EMS from [facility] for fall.   Differential Diagnosis:   Includes but is not limited to soft tissue contusion, bony contusion, hematoma, fractures, dislocations, head cellulitis, doubt septic arthritis, mechanical fall.             ED Course as of 03/25/22 1930   Fri Mar 25, 2022   1343 BP(!): 160/90 [CL]   1343 Temp: 98.4 °F (36.9 °C) [CL]   1343 Pulse: 82 [CL]   1343 Resp: 16 [CL]   1343 SpO2: 96 % [CL]   1537  WBC(!): 13.52 [CL]   1538 Hemoglobin(!): 11.5 [CL]   1538 Gran # (ANC)(!): 10.0 [CL]   1538 Gran %(!): 73.6 [CL]   1657 Platelets(!!): 1,045  History of thrombocytosis. Patient saw Heme last year. Was restarted on hydroxyurea. [CL]   1657 CRP(!): 22.7 [CL]   1657 Sed Rate(!): 47 [CL]   1657 Sodium: 144 [CL]   1657 Potassium: 3.9 [CL]   1657 Chloride: 109 [CL]   1657 CO2: 25 [CL]   1657 Glucose: 98 [CL]   1657 BUN: 22 [CL]   1657 Creatinine: 0.7 [CL]   1657 Calcium: 9.2 [CL]   1657 CXR with signs of possible CHF. Will add on BNP. X-ray of L hip and hand without acute fractures.   CT head without acute process.   CT c spine without acute process. DDD. [CL]   1658 BNP in process. [CL]   1658 Daughter updated with results and plan of care. [CL]   1829 Skin tear debrided. Xeroform applied. Dressed with guaze and coband.  [CL]      ED Course User Index  [CL] Latonia Hook PA-C           BNP in 200s. Doubt acute CHF.     Will give doxycyline for hand cellulitis given how well demarcated it is.   Wound care and instructions provided for skin tear.   Elevate LEs for dependent edema.   Patient has bacterial conj. Rx erythromycin ointment.   Ice hematoma.   Follow up with PCP and hematologist for thrombocytosis.   Patient comfortable with plan stable for discharge. Will need to transfer back to [facility].     I have reviewed patient's chart and discussed this case with my supervising MD.     Latonia Hook PA-C  Emergent Department  [facility]  Spectralink #[id number] or #[id number]    Clinical Impression:   Final diagnoses:  [W19.XXXA] Fall  [T14.8XXA] Hematoma  [L03.113] Cellulitis of hand, right (Primary)  [D75.839] Thrombocytosis  [S70.02XA] Hematoma of left hip, initial encounter  [S41.112A] Skin tear of left upper extremity  [H10.9] Bacterial conjunctivitis          ED Disposition Condition    Discharge Stable        ED Prescriptions     Medication Sig Dispense Start Date End Date Auth. Provider    doxycycline (VIBRAMYCIN)  100 MG Cap Take 1 capsule (100 mg total) by mouth every 12 (twelve) hours. for 10 days 20 capsule 3/25/2022 4/4/2022 Latonia Hook PA-C    erythromycin (ROMYCIN) ophthalmic ointment Place a 1/2 inch ribbon of ointment into the lower eyelid of each eye very 8 hours for 5 days. 3.5 g 3/25/2022  Latonia Hook PA-C        Follow-up Information     Follow up With Specialties Details Why Contact Info Additional Information    Marilin Perales MD Internal Medicine Schedule an appointment as soon as possible for a visit   [address]  [address]  [place] [address]  [phone]       [facility] - Hem Onc 2nd Fl Hematology and Oncology Schedule an appointment as soon as possible for a visit   [address] [address] [phone] Please park in the [facility] surface lot on the [place] side and check in on the 2nd floor    [facility] - Emergency Dept Emergency Medicine  If symptoms worsen [address] [id number] [phone]              Latonia Hook PA-C  03/25/22 1930

## 2022-03-25 NOTE — MEDICAL/APP STUDENT
History     Chief Complaint   Patient presents with    Fall     Sent from [facility],had fall yesterday and was seen by physician today and concerned for left hip fracture. No shortening or rotation noted, hematoma to left hip     Johnny Ferrari is an 81 y.o. male w/ a pmhx of anemia, anxiety, dementia, dysphagia, osteoarthritis on plavix, who presents to the ED from [facility] w/ left hip pain s/p a fall several days ago. EMS reports pt feel while he was at his NH and was transported to [facility] yesterday because he was throwing food at other people. Pt states he fell when moving from his bed to his wheelchair. Also c/o right hand pain. HPI is limited 2/2 dementia.           Past Medical History:   Diagnosis Date    Anemia     Anxiety     Dementia     Depression     Dysphagia     GERD (gastroesophageal reflux disease)     Insomnia     Osteoarthritis        History reviewed. No pertinent surgical history.    History reviewed. No pertinent family history.    Social History     Tobacco Use    Smoking status: Former Smoker     Types: Cigarettes     Start date:      Quit date:      Years since quittin.2    Smokeless tobacco: Never Used   Substance Use Topics    Alcohol use: Not Currently    Drug use: Not Currently       Review of Systems    Physical Exam   BP (!) 160/90   Pulse 82   Temp 98.4 °F (36.9 °C) (Oral)   Resp 16   SpO2 96%     Physical Exam    Constitutional: He appears well-developed and well-nourished. He is not diaphoretic. No distress.   HENT:   Head: Normocephalic and atraumatic.   Nose: Nose normal.   Eyes: Conjunctivae and EOM are normal. Pupils are equal, round, and reactive to light.   Neck: Neck supple.   C spine tenderness. FROM w/ pain   Cardiovascular: Normal rate, regular rhythm and normal heart sounds.   Pulmonary/Chest: Breath sounds normal. No respiratory distress. He has no wheezes.   Abdominal: Abdomen is soft. Bowel sounds are normal. He exhibits no  distension. There is no abdominal tenderness.   Musculoskeletal:      Cervical back: Neck supple.     Neurological: He is alert and oriented to person, place, and time. He has normal strength. GCS score is 15. GCS eye subscore is 4. GCS verbal subscore is 5. GCS motor subscore is 6.   Skin: Skin is warm and dry. No rash noted. No erythema.   Psychiatric: He has a normal mood and affect. His behavior is normal. Thought content normal.         ED Course

## 2022-03-25 NOTE — ED TRIAGE NOTES
Pt is a 80 yo male that presents to the ED via EMS transport from [facility]. Pt has a voluntary hold. Pt had a fall while transporting from bed to wheelchair. Hematoma to left hip that is tender to touch, bruising to bilateral arms, redness to right hand. Pt also c/o right shoulder pain. Denies LOC.

## 2022-03-25 NOTE — DISCHARGE INSTRUCTIONS
You are being treated for R hand cellulitis. Take doxycycline every 12 hours for the next 10 days.   Put ice on the hematoma to your L hip for 20 minutes every 4 hours to help with swelling and pain.  You do not have signs of HF. BNP in 200s. Elevate your legs when you are resting to help with swelling.  You do not have any bleeds in the head. No broken skull, neck, hand, or L hip or pelvis.   You still have high platelets. Follow up with Hematology. Continue Hydroxyurea as discussed.   Apply erythromycin ointment to both eyes every 8 hours for 5 days for bacterial conjunctivitis.  Return to the ED for new or worsening symptoms.  No future appointments.

## 2022-04-06 ENCOUNTER — OUTSIDE PLACE OF SERVICE (OUTPATIENT)
Dept: FAMILY MEDICINE | Facility: CLINIC | Age: 82
End: 2022-04-06
Payer: MEDICARE

## 2022-04-06 PROCEDURE — 99308 PR NURSING FAC CARE, SUBSEQ, MINOR COMPLIC: ICD-10-PCS | Mod: ,,, | Performed by: FAMILY MEDICINE

## 2022-04-06 PROCEDURE — 99308 SBSQ NF CARE LOW MDM 20: CPT | Mod: ,,, | Performed by: FAMILY MEDICINE

## 2022-04-27 ENCOUNTER — OUTSIDE PLACE OF SERVICE (OUTPATIENT)
Dept: FAMILY MEDICINE | Facility: CLINIC | Age: 82
End: 2022-04-27
Payer: MEDICARE

## 2022-04-27 PROCEDURE — 99499 UNLISTED E&M SERVICE: CPT | Mod: ,,, | Performed by: FAMILY MEDICINE

## 2022-04-27 PROCEDURE — 99499 NO LOS: ICD-10-PCS | Mod: ,,, | Performed by: FAMILY MEDICINE

## 2022-05-13 ENCOUNTER — TELEPHONE (OUTPATIENT)
Dept: FAMILY MEDICINE | Facility: CLINIC | Age: 82
End: 2022-05-13
Payer: MEDICARE

## 2022-05-13 NOTE — TELEPHONE ENCOUNTER
----- Message from Leona Peters sent at 5/13/2022 10:10 AM CDT -----  Contact: Jayne walsh/ [facility] [phone]  Type: Requesting to speak with nurse    Who Called: Jayne   Regarding: discuss auth for imaging    Would the patient rather a call back or a response via [facility]? Call back  Best Call Back Number: [phone]  Additional Information: n/a